# Patient Record
(demographics unavailable — no encounter records)

---

## 2025-04-09 NOTE — ASSESSMENT
[FreeTextEntry1] : Obesity/BMI 38: Patient has made many attempts in the past to lose weight, but she has not been successful with sustained medically meaningful weight loss. She is interested in trying a GLP drug. Her sister-in-law is on Zepbound and doing very well.  We discussed various weight loss drugs including phentermine, contrave, metformin in certain cases and the GLP class. She would like to try Zepbound 2.5 mg weekly through [facility] direct. Her insurance doesn't cover it for weight loss. She can switch to her 's insurance in the fall during open enrollment, and they do cover drugs for weight loss.  She will see me just before or just after the fourth injection at that dose.      Discussed risks, benefits, and side effects of drugs including, but not limited to: Nausea, vomiting, diarrhea, vomiting, constipation. Discussed incidence of thyroid cancer seen in rats, which have GLP-1 receptors on their thyroids, whereas, humans do not. Patient denies family history or personal history of thyroid cancer. We do not prescribe with personal history of medullary thyroid ca or MEN2 in the family. Discussed pancreatitis. At this time no causation definitively noted, and it appears incidence in GLP-1 taking patients not statistically significantly greater than baseline, though some cases of pancreatitis have been reported, as in the general population. Patient denies history of pancreatitis; would avoid in patients with personal history of pancreatitis. Reports of gallbladder disease, which is common in patients who lose significant weight. Hypersensitivity reactions can occur in 2-5% of patients. Dizziness and fatigue occur in 2-7% of patients.   Discussed need for regular exercise, specifically weight bearing exercise, to mitigate muscle loss.   Discussed need for healthy nutrition. Caloric intake will decrease because of decreased portion sizes, so proper nutrition is all the more important to avoid malnutrition in the face of decreased appetite.   Discussed proper administration of drug. If patient has questions once drug is picked up from pharmacy, patient is welcome to schedule brief time to see me during my lunch hour so that I can go over administration in person. Patient also informed there are online videos showing proper administration.  Discussed tracking location of injection with date and then jotting down any perceived side effects.  Discussed titration schedule of drug. Explained some patients require titration up 1-2 doses before seeing any effect. Others have notable decreased appetite on the first dose.   Reviewed these drugs are simply a tool to aid in weight loss. Healthy nutrition, portion control, exercise, adequate sleep, stress reduction, avoidance/moderation of risky substances, etc. are all still an to achieve optimal health.  Discussed principles of healthy nutrition including Lean Protein Unsaturated vs. saturated fats Whole carbohydrates High fiber diet  Reviewed nutrition basics handout and mediterranean diet handout with patient.  Discussed principles of physical activity, both exercise guidelines and NEAT.  Patient agreed to the following action plan: Zepbound [facility] 2.5 mg dose  Once you get it, you will follow up with me just before or on the day of your 4th shot.   Physical Activity  Mondays and Thursdays  Mornings after breakfast  Youtube  "ten minutes resistance bands upper body"  "ten minutes core"  "ten minutes resistance bands lower body"   Nutrition  Shoot for a serving of whole grains daily  Recipes given  Total time spent reviewing records, seeing patient, generating action plan, prescribing medication, documenting visit 70 minutes.

## 2025-04-09 NOTE — HISTORY OF PRESENT ILLNESS
[FreeTextEntry1] : 32 year old female presents to establish care for weight management. She has struggled with her weight her entire life. She had a baby 7 months ago. She is about 9 pounds over her prepregnancy weight. She is breast feeding, but she is almost done with weaning.  Previous weight loss attempts included WW, intermittent fasting, tracking calories. She did a starvation diet in her late teens and early 20's. She used to go to the gym a lot too. After COVID hit, she saw an increase in her commuting time and a decrease in her exercise time as a result. Currently she is staying home with her daughter full time, but she will be going back to work. Her  works from home a few times a week.  She was going to the gym until February. She then got the flu and covid and had a  and fell off. She belongs to [employer]. She used to do 1 hour on the elliptical or treadmill and 1/2 hour on the machines/strength.  24 hour recall BF: Greek yogurt with either fruit or granola Lunch: Turkey, american cheese, arugula, banana peppers on rye Dinner: Kale pesto with pasta  Fruit -- Likes most of them, eats one a day Vegetables -- Always with dinner 1/day, likes most of them Beans -- Loves them, 2-3 times per week Whole grains -- Brown rice 2-3 times per week Fish -- 1 time every 2 weeks Dairy -- 2% Fage yogurt almost every day, 2% milk in coffee, cheese on a sandwich, parmesean on pasta Meat -- Mostly chicken. Beef once every few weeks. Pork once every 3 weeks Eggs -- Occasional

## 2025-04-09 NOTE — SOCIAL HISTORY
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

## 2025-05-22 NOTE — ASSESSMENT
[FreeTextEntry1] : Chronic obesity with BMI 37: Patient is doing fairly well on the medication.  We are going to increase to 5 mg of Zepbound weekly and she will see me between the 3rd and 4th dose.  We discussed she should expect longer lasting and slightly stronger appetite suppression.  If the side effects worsen, she will let me know.  [facility] has a number of strength training routines using the baby as the weight or strapping the baby in the carrier and doing various exercises.  Patient will try to do that twice a week on Mondays and Thursdays while her daughter is in this very clingy, not wanting to be put down phase.  Patient will set the goal of 1-2 fruits per day and 2-3 vegetables per day, portion size reviewed.

## 2025-05-22 NOTE — HISTORY OF PRESENT ILLNESS
[FreeTextEntry1] : Patient presents/consents for telehealth with video and audio input.  She is in her home in [facility].  I am in my home office in [place], [place].  Deandra is here today for weight management follow-up.  She is on Zepbound 2.5 mg weekly, provided directly from manufacture.  She is tolerating it fairly well.  She usually has a mild headache the day after the shot, but she does not even always have to take anything for it.  She did have a little abdominal discomfort after having hamburger, but last night she had meatballs and controlled her portion size a bit better and had no problem.  No nausea, heartburn, change in bowel habits, fatigue.  She does note appetite suppression, but less so now at the end of the month than originally.  She delayed her start of medication so her starting weight was actually 222.8.  Today she is 212 pounds.  Strength training has been difficult to get in.  Her daughter is 8 months and going through a very clingy phase.  24-hour recall: Breakfast/lunch: Hummus, seed crackers, cucumber Dinner: Meatballs and a salad She did not think that the whole grains was such a good choice for her first goal.  She would like to focus more on consistently getting in enough fruits and vegetables for this next month.

## 2025-06-20 NOTE — ASSESSMENT
[FreeTextEntry1] : Chronic obesity with BMI 35: Patient has lost 10 pounds this month.  She is very pleased with how she is tolerating the medicine.  The appetite suppression effects are not wearing off at the end of the week, so there is no need to increase at this time.  She will take 5 mg for another month and see me for late July.  If the appetite suppression effects are wearing off sooner, she will let me know.  I am sending her a pamphlet on how to sign up for the following health portal.  Physical activity goal will be to be more consistent with the strength training twice a week. We want her to be getting 2 days of upper body, 2 days of lower body, 2 days of core exercises in each week.  Patient will work on getting more steps in regularly now that the weather is nicer.    Reinforced lean protein, high-fiber, low saturated fat, low added sugar diet.  Goals of 1-2 fruits a day, 2-3 vegetables a day, making grains whole grains reinforced.

## 2025-06-20 NOTE — HISTORY OF PRESENT ILLNESS
[FreeTextEntry1] : 32-year-old female presents/consents for telehealth with video and audio input.  She is in her home in [place], [place].  I am in my office in [place], [place].  Deandra presents for weight management follow-up.  She has been on Zepbound 5 mg for a month.  She is tolerating it very well with no side effects.  The appetite suppression effects are lasting the entire week.  She does not feel that it is wearing off at all.  Patient did a better job with strength training this month.  She did not use the baby as a weight because the baby is 20 pounds and too heavy.  She did get some 10-minute YouTube upper body and 10-minute YouTube lower body exercises in.  Reminded her to get some core and as well.  Patient succeeded in getting 1-2 fruits and per day and 2-3 vegetables in per day and she is focusing on making her grains whole grains. 24-hour recall Brunch: Greek yogurt with peaches Dinner: Chicken meatballs with the tahini sesame dressing, whole-wheat Sobo noodles, broccoli and green beans

## 2025-07-23 NOTE — HISTORY OF PRESENT ILLNESS
[FreeTextEntry1] : 32-year-old female presents/consents for telehealth with video and audio input.  She is in her home in [place], [place].  I am in my home office in [place], [place].  Deandra presents for weight management follow-up.  She has been taking Zepbound 5 mg this past month.  She said it is very similar to the month before and that she is tolerating it well.  No major complaints of nausea, vomiting, diarrhea, constipation, heartburn, fatigue.  She feels like the appetite suppression is similar, but lasting the entire week.  She fell off a little on her strength training the last 2 weeks because her daughter was ill and she was working a little bit, but she understands how important it is and it is a goal and priority for her to get back on track.  She did increase her nonexercise activity steps.  24-hour recall Huber: Yogurt with fruit Dinner: Grilled steak with green beans and show she does peppers Patient says she drinks plenty of water every day.

## 2025-07-23 NOTE — ASSESSMENT
[FreeTextEntry1] : Chronic obesity with BMI 33: Patient is doing very well on her Zepbound 5 mg.  The appetite suppression appears to be lasting for the entire week.  This past month she lost 8 pounds.  Were not looking for a faster pace than 8 to 10 pounds a month, so we will continue the Zepbound 5 mg for now.    Patient's main priority is to get back on track with her strength training twice a week and continue with increased nonexercise activity.  Once the strength training twice a week is set up, we will add some more active cardio.  Patient to continue with a lean protein, low saturated fat, high-fiber, low added sugar diet.  She gets her fruit and consistently with breakfast and always has at least 2 vegetables with dinner.  Emphasize lean protein and grains being of the whole-grain variety.  Follow-up September.